# Patient Record
Sex: FEMALE | Race: WHITE
[De-identification: names, ages, dates, MRNs, and addresses within clinical notes are randomized per-mention and may not be internally consistent; named-entity substitution may affect disease eponyms.]

---

## 2017-11-19 ENCOUNTER — HOSPITAL ENCOUNTER (EMERGENCY)
Dept: HOSPITAL 7 - FB.ED | Age: 32
Discharge: HOME | End: 2017-11-19
Payer: COMMERCIAL

## 2017-11-19 VITALS — SYSTOLIC BLOOD PRESSURE: 121 MMHG | DIASTOLIC BLOOD PRESSURE: 59 MMHG

## 2017-11-19 DIAGNOSIS — R10.9: Primary | ICD-10-CM

## 2017-11-19 NOTE — ER
DATE SEEN:  11/19/2017

 

TIME SEEN:  2200 hours.

 

CHIEF COMPLAINT:  Abdominal pain.

 

HISTORY OF PRESENT ILLNESS:  This is a 32-year-old female with abdominal pain on

and off for two days, right lower quadrant, radiates to the umbilicus,

associated with diarrhea.  Nothing seems to help.

 

REVIEW OF SYSTEMS:  Denies any urinary symptoms, any vaginal discharge, but

complains of painful sex.

 

SOCIAL HISTORY:  Tobacco use disorder.

 

FAMILY HISTORY:  Crohn disease and colon cancer in the father.

 

PHYSICAL EXAMINATION:  GENERAL:  Afebrile, normotensive, generally not in

distress.  ABDOMEN:  Soft with tenderness to the right lower quadrant in the

pelvic area.  MENTAL STATUS:  Alert, tearful at times.

 

LABS:  Normal labs including a urine pregnancy, CBC, and CMP.  CT of the abdomen

and pelvis showed some circumferential inflammation of the terminal ileus,

nonspecific findings.  No appendicitis.

 

IMPRESSION:  Abdominal pain of unknown reason.

 

PLAN:  Treatment supportive.  Prescription for Bentyl was provided and the

patient advised to follow up with PCP in this coming week.

 

Job#: [id number]

DD: 11/19/2017 2330

DT: 11/19/2017 2343 TN/KP

## 2018-06-18 ENCOUNTER — HOSPITAL ENCOUNTER (EMERGENCY)
Dept: HOSPITAL 7 - FB.ED | Age: 33
Discharge: HOME | End: 2018-06-18
Payer: COMMERCIAL

## 2018-06-18 VITALS — SYSTOLIC BLOOD PRESSURE: 103 MMHG | DIASTOLIC BLOOD PRESSURE: 50 MMHG

## 2018-06-18 DIAGNOSIS — Z79.899: ICD-10-CM

## 2018-06-18 DIAGNOSIS — K85.90: ICD-10-CM

## 2018-06-18 DIAGNOSIS — K50.00: Primary | ICD-10-CM

## 2018-06-18 DIAGNOSIS — Z79.84: ICD-10-CM

## 2018-06-18 PROCEDURE — 85025 COMPLETE CBC W/AUTO DIFF WBC: CPT

## 2018-06-18 PROCEDURE — 81025 URINE PREGNANCY TEST: CPT

## 2018-06-18 PROCEDURE — 99284 EMERGENCY DEPT VISIT MOD MDM: CPT

## 2018-06-18 PROCEDURE — 96374 THER/PROPH/DIAG INJ IV PUSH: CPT

## 2018-06-18 PROCEDURE — 82150 ASSAY OF AMYLASE: CPT

## 2018-06-18 PROCEDURE — 36415 COLL VENOUS BLD VENIPUNCTURE: CPT

## 2018-06-18 PROCEDURE — 96361 HYDRATE IV INFUSION ADD-ON: CPT

## 2018-06-18 PROCEDURE — 86140 C-REACTIVE PROTEIN: CPT

## 2018-06-18 PROCEDURE — 74177 CT ABD & PELVIS W/CONTRAST: CPT

## 2018-06-18 PROCEDURE — 81001 URINALYSIS AUTO W/SCOPE: CPT

## 2018-06-18 PROCEDURE — 80053 COMPREHEN METABOLIC PANEL: CPT

## 2018-06-18 NOTE — EDM.PDOC
ED HPI GENERAL MEDICAL PROBLEM





- General


Chief Complaint: Abdominal Pain


Stated Complaint: STOMACH PAIN,VOMITTING


Time Seen by Provider: 06/18/18 13:50


Source of Information: Reports: Patient


History Limitations: Reports: No Limitations





- History of Present Illness


INITIAL COMMENTS - FREE TEXT/NARRATIVE: 





c/o mid abd pain x 1w





dx Crohn 12/17, on Imuran x 2m





seen [place] [facility] 5d ago and had abd CT with mild inflammation at terminal ileum 

and neg CBC/CMP, did not have amylase, tx Protonix for GERD





pt still with pain across upper abd and has amylase >650, CT today with nl 

pancreas and inflammatoin still at termianl ileum but also involving 2 loops of 

jejenum





pt appears to have both pancreatitis (with nl LFTs) and flare of Crohns





pt wonders if Imuran may have caused pancreatitis (<1% occurrence as per 

UpToDate), had less pain off of Imuran which GI told her works only 30% of the 

time





pt comes to ED today at instruction of her GI office





pt adamant about not being admitted altho understands that she can come back





she will check with her GI tomorrow





some N, no V, no f/c/d





was quite uncomfortable, did feel better after IVF





- Related Data


 Allergies











Allergy/AdvReac Type Severity Reaction Status Date / Time


 


No Known Allergies Allergy   Verified 06/18/18 14:19











Home Meds: 


 Home Meds





metFORMIN [Glucophage XR] 750 mg PO DAILY 09/30/16 [History]


Acetaminophen/HYDROcodone [Norco 325-5 MG] 1 tab PO Q6H PRN #6 tab 06/18/18 [Rx]


Ethinyl Estradiol/Drospirenone [Ocella 3 MG-0.03 MG] 1 tab PO DAILY 06/18/18 [

History]


Ondansetron [Ondansetron ODT] 4 mg PO Q6H PRN #6 tab.rapdis 06/18/18 [Rx]


Pantoprazole [ProTONIX***] 40 mg PO DAILY 06/18/18 [History]


Sucralfate [Carafate] 10 ml PO QID 06/18/18 [History]











Past Medical History


OB/GYN History: Reports: Polycystic Ovaries, Pregnancy





- Past Surgical History


GI Surgical History: Reports: Cholecystectomy





Social & Family History





- Family History


Family Medical History: Unobtainable





- Caffeine Use


Caffeine Use: Reports: Soda, Tea





ED ROS GENERAL





- Review of Systems


Review Of Systems: See Below


Constitutional: Reports: No Symptoms.  Denies: Fever


HEENT: Reports: No Symptoms


Respiratory: Reports: No Symptoms


Cardiovascular: Reports: No Symptoms


Endocrine: Reports: No Symptoms


GI/Abdominal: Reports: Abdominal Pain, Nausea


: Reports: No Symptoms


Musculoskeletal: Reports: No Symptoms


Skin: Reports: No Symptoms


Neurological: Reports: No Symptoms


Psychiatric: Reports: No Symptoms


Hematologic/Lymphatic: Reports: No Symptoms


Immunologic: Reports: No Symptoms





ED EXAM, GI/ABD





- Physical Exam


Exam: See Below


Exam Limited By: No Limitations


General Appearance: Alert, WD/WN, Moderate Distress


Ears: Normal External Exam, Hearing Grossly Normal


Nose: Normal Inspection, Normal Mucosa, No Blood


Throat/Mouth: Normal Inspection, Normal Oropharynx, Normal Voice


Head: Atraumatic, Normocephalic


Neck: Normal Inspection, Supple, Non-Tender, Full Range of Motion


Respiratory/Chest: No Respiratory Distress, Lungs Clear, Normal Breath Sounds, 

No Accessory Muscle Use, Chest Non-Tender


Cardiovascular: Normal Peripheral Pulses, Regular Rate, Rhythm, No Edema, No 

Gallop, No JVD, No Murmur, No Rub


GI/Abdominal Exam: Soft, No Distention, Other (1+ tender across upper abd, not 

localized, no guard, no rebound)


Back Exam: Normal Inspection, Full Range of Motion, NT


Extremities: Normal Inspection, Non-Tender


Neurological: Alert, Oriented, CN II-XII Intact, Normal Cognition, Normal Gait, 

Normal Reflexes, No Motor/Sensory Deficits


Psychiatric: Normal Affect, Normal Mood


Skin Exam: Warm, Dry, Intact, Normal Color, No Rash


Lymphatic: No Adenopathy





Course





- Orders/Labs/Meds


Orders: 


 Active Orders 24 hr











 Category Date Time Status


 


 Abdomen Pelvis w Cont [CT] Stat Exams  06/18/18 14:41 Taken


 


 HCG QUALITATIVE,URINE [URCHEM] Stat Lab  06/18/18 14:45 Ordered


 


 URINALYSIS W/MICROSCOPIC [UA W/MICROSCOPIC] [URIN] Stat Lab  06/18/18 14:45 

Ordered











Labs: 


 Laboratory Tests











  06/18/18 06/18/18 06/18/18 Range/Units





  14:05 14:05 14:05 


 


WBC  11.6    (4.5-12.0)  X10-3/uL


 


RBC  4.43    (3.23-5.20)  x10(6)uL


 


Hgb  14.0    (11.5-15.5)  g/dL


 


Hct  41.7    (30.0-51.3)  %


 


MCV  94.1    (80-96)  fL


 


MCH  31.5    (27.7-33.6)  pg


 


MCHC  33.5    (32.2-35.4)  g/dL


 


RDW  12.2    (11.5-15.5)  %


 


Plt Count  219    (125-369)  X10(3)uL


 


MPV  8.3    (7.4-10.4)  fL


 


Neut % (Auto)  79.6    (46-82)  %


 


Lymph % (Auto)  10.5 L    (13-37)  %


 


Mono % (Auto)  8.5    (4-12)  %


 


Eos % (Auto)  0 L    (1.0-5.0)  %


 


Baso % (Auto)  1    (0-2)  %


 


Neut # (Auto)  9.3 H    (1.6-8.3)  #


 


Lymph # (Auto)  1.2    (0.6-5.0)  #


 


Mono # (Auto)  1.0    (0.0-1.3)  #


 


Eos # (Auto)  0.0    (0.0-0.8)  #


 


Baso # (Auto)  0.1    (0.0-0.2)  #


 


Sodium   139   (135-145)  mmol/L


 


Potassium   3.5   (3.5-5.3)  mmol/L


 


Chloride   104   (100-110)  mmol/L


 


Carbon Dioxide   26   (21-32)  mmol/L


 


BUN   13   (7-18)  mg/dL


 


Creatinine   0.7   (0.55-1.02)  mg/dL


 


Est Cr Clr Drug Dosing   TNP   


 


Estimated GFR (MDRD)   > 60   (>60)  


 


BUN/Creatinine Ratio   18.6   (9-20)  


 


Glucose   108   ()  mg/dL


 


Calcium   8.4 L   (8.6-10.2)  mg/dL


 


Total Bilirubin   0.1   (0.1-1.3)  mg/dL


 


AST   16   (5-25)  IU/L


 


ALT   19   (12-36)  U/L


 


Alkaline Phosphatase   63   ()  IU/L


 


C-Reactive Protein    0.5  (0.5-0.9)  mg/dL


 


Total Protein   6.8   (6.0-8.0)  g/dL


 


Albumin   3.2 L   (3.5-5.2)  g/dL


 


Globulin   3.6   g/dL


 


Albumin/Globulin Ratio   0.9   


 


Amylase   > 650 H*   ()  U/L


 


Urine Color     (YELLOW)  


 


Urine Appearance     (CLEAR)  


 


Urine pH     (5.0-6.5)  


 


Ur Specific Gravity     (1.010-1.025)  


 


Urine Protein     (NEGATIVE)  mg/dL


 


Urine Glucose (UA)     (NEGATIVE)  mg/dL


 


Urine Ketones     (NEGATIVE)  mg/dL


 


Urine Occult Blood     (NEGATIVE)  


 


Urine Nitrite     (NEGATIVE)  


 


Urine Bilirubin     (NEGATIVE)  


 


Urine Urobilinogen     (NEGATIVE)  mg/dL


 


Ur Leukocyte Esterase     (NEGATIVE)  


 


Urine RBC     (0)  


 


Urine WBC     (0)  


 


Ur Squamous Epith Cells     (NS,R,O)  


 


Urine Bacteria     (NS)  


 


Urine HCG, Qual     (NEGATIVE)  














  06/18/18 06/18/18 Range/Units





  14:45 14:45 


 


WBC    (4.5-12.0)  X10-3/uL


 


RBC    (3.23-5.20)  x10(6)uL


 


Hgb    (11.5-15.5)  g/dL


 


Hct    (30.0-51.3)  %


 


MCV    (80-96)  fL


 


MCH    (27.7-33.6)  pg


 


MCHC    (32.2-35.4)  g/dL


 


RDW    (11.5-15.5)  %


 


Plt Count    (125-369)  X10(3)uL


 


MPV    (7.4-10.4)  fL


 


Neut % (Auto)    (46-82)  %


 


Lymph % (Auto)    (13-37)  %


 


Mono % (Auto)    (4-12)  %


 


Eos % (Auto)    (1.0-5.0)  %


 


Baso % (Auto)    (0-2)  %


 


Neut # (Auto)    (1.6-8.3)  #


 


Lymph # (Auto)    (0.6-5.0)  #


 


Mono # (Auto)    (0.0-1.3)  #


 


Eos # (Auto)    (0.0-0.8)  #


 


Baso # (Auto)    (0.0-0.2)  #


 


Sodium    (135-145)  mmol/L


 


Potassium    (3.5-5.3)  mmol/L


 


Chloride    (100-110)  mmol/L


 


Carbon Dioxide    (21-32)  mmol/L


 


BUN    (7-18)  mg/dL


 


Creatinine    (0.55-1.02)  mg/dL


 


Est Cr Clr Drug Dosing    


 


Estimated GFR (MDRD)    (>60)  


 


BUN/Creatinine Ratio    (9-20)  


 


Glucose    ()  mg/dL


 


Calcium    (8.6-10.2)  mg/dL


 


Total Bilirubin    (0.1-1.3)  mg/dL


 


AST    (5-25)  IU/L


 


ALT    (12-36)  U/L


 


Alkaline Phosphatase    ()  IU/L


 


C-Reactive Protein    (0.5-0.9)  mg/dL


 


Total Protein    (6.0-8.0)  g/dL


 


Albumin    (3.5-5.2)  g/dL


 


Globulin    g/dL


 


Albumin/Globulin Ratio    


 


Amylase    ()  U/L


 


Urine Color  Yellow   (YELLOW)  


 


Urine Appearance  Clear   (CLEAR)  


 


Urine pH  7.0 H   (5.0-6.5)  


 


Ur Specific Gravity  1.010   (1.010-1.025)  


 


Urine Protein  Negative   (NEGATIVE)  mg/dL


 


Urine Glucose (UA)  Normal   (NEGATIVE)  mg/dL


 


Urine Ketones  Negative   (NEGATIVE)  mg/dL


 


Urine Occult Blood  Negative   (NEGATIVE)  


 


Urine Nitrite  Negative   (NEGATIVE)  


 


Urine Bilirubin  Negative   (NEGATIVE)  


 


Urine Urobilinogen  Normal   (NEGATIVE)  mg/dL


 


Ur Leukocyte Esterase  Negative   (NEGATIVE)  


 


Urine RBC  0-5   (0)  


 


Urine WBC  0-5   (0)  


 


Ur Squamous Epith Cells  Occasional   (NS,R,O)  


 


Urine Bacteria  Rare H   (NS)  


 


Urine HCG, Qual   Negative  (NEGATIVE)  











Meds: 


Medications














Discontinued Medications














Generic Name Dose Route Start Last Admin





  Trade Name Freq  PRN Reason Stop Dose Admin


 


Acetaminophen  1,000 mg  06/18/18 13:58  06/18/18 14:11





  Tylenol Extra Strength  PO  06/18/18 13:59  1,000 mg





  ONETIME ONE   Administration





     





     





     





     


 


Al Hydroxide/Mg Hydroxide  30 ml  06/18/18 14:18  06/18/18 14:24





  Mag-Al Susp  PO  06/18/18 14:19  30 ml





  NOW ONE   Administration





     





     





     





     


 


Diatrizoate Meglum/Diatrizoate Sod  30 ml  06/18/18 15:25  06/18/18 16:12





  Gastrografin 37%  PO  06/18/18 15:26  30 ml





  .AS DIRECTED ONE   Administration





     





     





     





     


 


Sodium Chloride  1,000 mls @ 999 mls/hr  06/18/18 13:58  06/18/18 14:11





  Normal Saline  IV  06/18/18 14:58  999 mls/hr





  .BOLUS ONE   Administration





     





     





     





     


 


Iopamidol  75 ml  06/18/18 15:37  06/18/18 16:10





  Isovue-370 (76%)  IV  06/18/18 15:38  75 ml





  ONETIME ONE   Administration





     





     





     





     


 


Ketorolac Tromethamine  30 mg  06/18/18 13:58  06/18/18 14:11





  Toradol  IVPUSH  06/18/18 13:59  30 mg





  ONETIME ONE   Administration





     





     





     





     


 


Lidocaine HCl  15 ml  06/18/18 14:19  06/18/18 14:25





  Xylocaine 2% Viscous  PO  06/18/18 14:20  15 ml





  ONETIME ONE   Administration





     





     





     





     


 


Ondansetron HCl  4 mg  06/18/18 13:58  06/18/18 14:11





  Zofran  IVPUSH  06/18/18 13:59  4 mg





  ONETIME ONE   Administration





     





     





     





     














Departure





- Departure


Time of Disposition: 17:28


Disposition: Home, Self-Care 01


Condition: Fair


Clinical Impression: 


 Crohns disease of small intestine, Pancreatitis








- Discharge Information


Prescriptions: 


Acetaminophen/HYDROcodone [Norco 325-5 MG] 1 tab PO Q6H PRN #6 tab


 PRN Reason: Pain


Ondansetron [Ondansetron ODT] 4 mg PO Q6H PRN #6 tab.rapdis


 PRN Reason: Nausea


Instructions:  Crohn Disease, Acute Pancreatitis


Referrals: 


Tremayne Tolliver PA [Primary Care Provider] - 


Forms:  ED Department Discharge


Additional Instructions: 


For pancreatitis, it is best not to eat for 24 hours, may drink clear liquids. 

Then continue clear liquids for 24 hours, then full liquid diet for 24 hours, 

then soft diet for 24 hours, then regular diet.





For pain, take acetaminophen 325 mg 2 tabs 4 times a day.





For pain, take acetaminophen with hydrocodone 5/325 mg 1 tab every 6 hours as 

needed.





For nausea, take ondansetron ODT 4 mg 1 tab under the tongue every 6 hours as 

needed.





For flare of Crohn disease, take prednisone 10 mg 3 tabs at bedtime tonight and 

tomorrow, then 2 tabs for the next three bedtimes.





Check with your GI doctor tomorrow.





Return to ED if you are feeling worse.





- My Orders


Last 24 Hours: 


My Active Orders





06/18/18 14:41


Abdomen Pelvis w Cont [CT] Stat 





06/18/18 14:45


HCG QUALITATIVE,URINE [URCHEM] Stat 


URINALYSIS W/MICROSCOPIC [UA W/MICROSCOPIC] [URIN] Stat 














- Assessment/Plan


Last 24 Hours: 


My Active Orders





06/18/18 14:41


Abdomen Pelvis w Cont [CT] Stat 





06/18/18 14:45


HCG QUALITATIVE,URINE [URCHEM] Stat 


URINALYSIS W/MICROSCOPIC [UA W/MICROSCOPIC] [URIN] Stat

## 2018-06-19 NOTE — CT
INDICATION:  Mid abdominal pain times one week, history of Crohns disease, 
very high amylase.



CT ABDOMEN AND PELVIS WITH CONTRAST:  Spiral 2.5 mm axial sections were 
obtained through the abdomen and pelvis with oral and IV contrast (73 mL Isovue 
370 at 2.6 mL/second and 100 second delay), with sagittal and coronal 
reconstructions, 06/18/2018 and compared with 11/19/2017.  



Total exam DLP = 407.60 mGy-cm.



On axial images #13 and #14, there is a small nodular density in the mid lobe, 
which may not have been included on the previous examination and likely 
represents a benign structure, possibly a lymph node. 



No definite active infiltrate or effusion was identified.  There is noted a 
calcified granuloma at the left lower lobe - lung base, which was present 
previously.



No pericardial effusion was seen.  The heart did not appear enlarged.  



The liver was normal in appearance.  Clips compatible with cholecystectomy are 
noted.  



The adrenal glands appear normal.  



Probable bilateral cystic structures are noted in the kidneys.  A tiny lesion 
is noted medially in the lower pole of the right kidney with a small lesion and 
a moderate sized lesion in the lower pole of the left kidney, the largest being 
in the lower pole inferiorly and measuring 24 mm.  No definite solid renal 
masses were seen.  No obstructive uropathy was identified.  



There appears to be a somewhat thickened wall of a few loops of proximal 
jejunum.  



One more distal jejunal loop appears to have a thickened wall also, and there 
appears to be thickened wall of the terminal ileum, which was also seen on the 
previous examination.  These findings are compatible with Crohns disease but 
should be correlated clinically.  



Otherwise, no organomegaly, mass lesions, or free fluid collections were 
identified in the abdomen.



In the pelvis, there is a probable follicular cyst measuring approximately 35 
mm at the left ovary and follicles seen at the right ovary, the largest 
measuring approximately 1.7 mm.  



No additional mass lesions, organomegaly, or free fluid collections were 
identified in the abdomen or pelvis.  



The urinary bladder was unremarkable.  



No bowel obstruction or free air was seen.



What appears to be the appendix appeared normal, seen on axial images #126 
through #136.  



IMPRESSION:

1.  Findings compatible with Crohns disease with thickening of the wall of 
bowel loops in the proximal and mid jejunum area and at the terminal ileum.  No 
free air, obstruction, or peritonitis was suggested.

2.  Probable follicular cyst at the left ovary.

3.  Post cholecystectomy.

4.  Minimal multicystic disease of the kidneys, the largest cyst being in the 
lower pole of the left kidney and measuring approximately 25 mm anterior-
posterior.



Report was called to Dr. Luke at 1739 hours on 06/18/2018.

CHRIS

## 2018-09-14 ENCOUNTER — HOSPITAL ENCOUNTER (EMERGENCY)
Dept: HOSPITAL 7 - FB.ED | Age: 33
Discharge: HOME | End: 2018-09-14
Payer: COMMERCIAL

## 2018-09-14 DIAGNOSIS — Z79.899: ICD-10-CM

## 2018-09-14 DIAGNOSIS — F17.210: ICD-10-CM

## 2018-09-14 DIAGNOSIS — M94.0: Primary | ICD-10-CM

## 2018-09-14 RX ADMIN — IOPAMIDOL ONE ML: 755 INJECTION, SOLUTION INTRAVENOUS at 22:10

## 2018-09-14 NOTE — EDM.PDOC
ED HPI GENERAL MEDICAL PROBLEM





- General


Chief Complaint: Chest Pain


Stated Complaint: CHESTPAIN


Time Seen by Provider: 09/14/18 19:36


Source of Information: Reports: Patient


History Limitations: Reports: No Limitations





- History of Present Illness


INITIAL COMMENTS - FREE TEXT/NARRATIVE: 





Presents with intermittent sharp chest pain associated with lightheadedness 

since this morning. Has been taking Filgotinib (experimental Crohns medication) 

x 10 weeks. No prior h/o CAD. Has had a cough for 3 weeks, was prescribed 

zithromax, cough has improved but persists.


Onset: Today


Duration: Day(s): (1)


Location: Reports: Chest


Quality: Reports: Sharp


Severity: Mild


  ** L anterior chest


Pain Score (Numeric/FACES): 5





- Related Data


 Allergies











Allergy/AdvReac Type Severity Reaction Status Date / Time


 


No Known Allergies Allergy   Verified 09/14/18 19:28











Home Meds: 


 Home Meds





.Filgotinib 200 mg DAILY 09/14/18 [History]


Levonorgestrel-Ethin Estradiol [Enpresse-28 Tablet] 1 tab DAILY 09/14/18 [

History]











Past Medical History


HEENT History: Reports: Impaired Vision


Gastrointestinal History: Reports: Inflammatory Bowel Disease (crohns disease), 

Irritable Bowel Syndrome


OB/GYN History: Reports: Polycystic Ovaries, Pregnancy





- Past Surgical History


GI Surgical History: Reports: Cholecystectomy





Social & Family History





- Family History


Family Medical History: Unobtainable





- Tobacco Use


Smoking Status *Q: Current Every Day Smoker


Tobacco Use Within Last Twelve Months: Cigarettes





- Caffeine Use


Caffeine Use: Reports: Soda, Tea





- Alcohol Use


Alcohol Use History: Yes


Alcohol Use Frequency: Rarely





ED ROS GENERAL





- Review of Systems


Review Of Systems: See Below


Constitutional: Reports: Other (lightheaded, near syncope)


HEENT: Reports: No Symptoms


Respiratory: Reports: Pleuritic Chest Pain, Cough


Cardiovascular: Reports: Chest Pain


Endocrine: Reports: No Symptoms


GI/Abdominal: Reports: No Symptoms


: Reports: No Symptoms


Musculoskeletal: Reports: No Symptoms


Skin: Reports: No Symptoms


Neurological: Reports: Dizziness.  Denies: Headache


Psychiatric: Reports: No Symptoms


Hematologic/Lymphatic: Reports: No Symptoms


Immunologic: Reports: No Symptoms





ED EXAM, GENERAL





- Physical Exam


Exam: See Below


Exam Limited By: No Limitations


General Appearance: Alert, WD/WN, No Apparent Distress


Ears: Normal External Exam


Nose: Normal Inspection, Normal Mucosa


Throat/Mouth: No Airway Compromise


Head: Atraumatic, Normocephalic


Neck: Full Range of Motion


Respiratory/Chest: No Respiratory Distress, Lungs Clear, Normal Breath Sounds, 

Other (+ left sided chest wall tenderness)


Cardiovascular: Regular Rate, Rhythm, No Gallop, No Murmur, No Rub


GI/Abdominal: No Distention


Extremities: Normal Range of Motion


Neurological: Alert, Oriented, No Motor/Sensory Deficits


Psychiatric: Normal Affect, Normal Mood


Skin Exam: Warm, Dry, Intact





EKG INTERPRETATION


EKG Date: 09/14/18


Time: 19:13


Rhythm: NSR


Rate (Beats/Min): 76


Axis: Normal


P-Wave: Present


QRS: Normal


ST-T: Normal


QT: Normal





Course





- Vital Signs


Last Recorded V/S: 


 Last Vital Signs











Temp  36.9 C   09/14/18 19:05


 


Pulse  64   09/14/18 19:05


 


Resp  18   09/14/18 19:05


 


BP  120/67   09/14/18 19:05


 


Pulse Ox  99   09/14/18 19:05














- Orders/Labs/Meds


Orders: 


 Active Orders 24 hr











 Category Date Time Status


 


 EKG Documentation Completion [RC] ASDIRECTED Care  09/14/18 19:34 Active


 


 CXR [Chest 1V Frontal] [CR] Stat Exams  09/14/18 19:34 Taken


 


 Chest w Cont [CT] Stat Exams  09/14/18 21:57 Taken


 


 HCG QUALITATIVE,URINE [URCHEM] Stat Lab  09/14/18 19:34 Ordered


 


 UA W/MICROSCOPIC [URIN] Stat Lab  09/14/18 19:34 Ordered


 


 Sodium Chloride 0.9% [Saline Flush] Med  09/14/18 19:35 Active





 10 ml FLUSH ASDIRECTED PRN   


 


 Saline Lock Insert [OM.PC] Routine Oth  09/14/18 19:35 Ordered


 


 EKG 12 Lead [EK] Stat Ther  09/14/18 19:34 Ordered








 Medication Orders





Sodium Chloride (Saline Flush)  10 ml FLUSH ASDIRECTED PRN


   PRN Reason: Keep Vein Open








Labs: 


 Laboratory Tests











  09/14/18 09/14/18 09/14/18 Range/Units





  19:22 19:22 19:22 


 


WBC  9.4    (4.5-12.0)  X10-3/uL


 


RBC  4.65    (3.23-5.20)  x10(6)uL


 


Hgb  14.8    (11.5-15.5)  g/dL


 


Hct  43.7    (30.0-51.3)  %


 


MCV  94.0    (80-96)  fL


 


MCH  31.7    (27.7-33.6)  pg


 


MCHC  33.8    (32.2-35.4)  g/dL


 


RDW  11.9    (11.5-15.5)  %


 


Plt Count  244    (125-369)  X10(3)uL


 


MPV  9.1    (7.4-10.4)  fL


 


Neut % (Auto)  71.0    (46-82)  %


 


Lymph % (Auto)  20.4    (13-37)  %


 


Mono % (Auto)  5.9    (4-12)  %


 


Eos % (Auto)  2    (1.0-5.0)  %


 


Baso % (Auto)  1    (0-2)  %


 


Neut # (Auto)  6.6    (1.6-8.3)  #


 


Lymph # (Auto)  1.9    (0.6-5.0)  #


 


Mono # (Auto)  0.6    (0.0-1.3)  #


 


Eos # (Auto)  0.2    (0.0-0.8)  #


 


Baso # (Auto)  0.1    (0.0-0.2)  #


 


PT   9.2   (8.7-11.1)  


 


INR   0.95   (0.89-1.13)  


 


D-Dimer, Quantitative    0.27  (0.0-0.59)  mg/LFEU


 


Sodium     (135-145)  mmol/L


 


Potassium     (3.5-5.3)  mmol/L


 


Chloride     (100-110)  mmol/L


 


Carbon Dioxide     (21-32)  mmol/L


 


BUN     (7-18)  mg/dL


 


Creatinine     (0.55-1.02)  mg/dL


 


Est Cr Clr Drug Dosing     


 


Estimated GFR (MDRD)     (>60)  


 


BUN/Creatinine Ratio     (9-20)  


 


Glucose     ()  mg/dL


 


Calcium     (8.6-10.2)  mg/dL


 


Total Bilirubin     (0.1-1.3)  mg/dL


 


AST     (5-25)  IU/L


 


ALT     (12-36)  U/L


 


Alkaline Phosphatase     ()  IU/L


 


Troponin I     (<0.017-0.056)  ng/mL


 


Total Protein     (6.0-8.0)  g/dL


 


Albumin     (3.5-5.2)  g/dL


 


Globulin     g/dL


 


Albumin/Globulin Ratio     














  09/14/18 09/14/18 Range/Units





  19:22 19:22 


 


WBC    (4.5-12.0)  X10-3/uL


 


RBC    (3.23-5.20)  x10(6)uL


 


Hgb    (11.5-15.5)  g/dL


 


Hct    (30.0-51.3)  %


 


MCV    (80-96)  fL


 


MCH    (27.7-33.6)  pg


 


MCHC    (32.2-35.4)  g/dL


 


RDW    (11.5-15.5)  %


 


Plt Count    (125-369)  X10(3)uL


 


MPV    (7.4-10.4)  fL


 


Neut % (Auto)    (46-82)  %


 


Lymph % (Auto)    (13-37)  %


 


Mono % (Auto)    (4-12)  %


 


Eos % (Auto)    (1.0-5.0)  %


 


Baso % (Auto)    (0-2)  %


 


Neut # (Auto)    (1.6-8.3)  #


 


Lymph # (Auto)    (0.6-5.0)  #


 


Mono # (Auto)    (0.0-1.3)  #


 


Eos # (Auto)    (0.0-0.8)  #


 


Baso # (Auto)    (0.0-0.2)  #


 


PT    (8.7-11.1)  


 


INR    (0.89-1.13)  


 


D-Dimer, Quantitative    (0.0-0.59)  mg/LFEU


 


Sodium  140   (135-145)  mmol/L


 


Potassium  3.4 L   (3.5-5.3)  mmol/L


 


Chloride  106   (100-110)  mmol/L


 


Carbon Dioxide  27   (21-32)  mmol/L


 


BUN  9   (7-18)  mg/dL


 


Creatinine  0.8   (0.55-1.02)  mg/dL


 


Est Cr Clr Drug Dosing  TNP   


 


Estimated GFR (MDRD)  > 60   (>60)  


 


BUN/Creatinine Ratio  11.3   (9-20)  


 


Glucose  99   ()  mg/dL


 


Calcium  8.6   (8.6-10.2)  mg/dL


 


Total Bilirubin  0.2   (0.1-1.3)  mg/dL


 


AST  12  D   (5-25)  IU/L


 


ALT  23  D   (12-36)  U/L


 


Alkaline Phosphatase  63   ()  IU/L


 


Troponin I   < 0.017 L  (<0.017-0.056)  ng/mL


 


Total Protein  7.0   (6.0-8.0)  g/dL


 


Albumin  3.4 L   (3.5-5.2)  g/dL


 


Globulin  3.6   g/dL


 


Albumin/Globulin Ratio  0.9   











Meds: 


Medications











Generic Name Dose Route Start Last Admin





  Trade Name Freq  PRN Reason Stop Dose Admin


 


Sodium Chloride  10 ml  09/14/18 19:35  





  Saline Flush  FLUSH   





  ASDIRECTED PRN   





  Keep Vein Open   





     





     





     














Discontinued Medications














Generic Name Dose Route Start Last Admin





  Trade Name Freq  PRN Reason Stop Dose Admin


 


Sodium Chloride  1,000 mls @ 999 mls/hr  09/14/18 19:35  09/14/18 19:48





  Normal Saline  IV  09/14/18 20:35  999 mls/hr





  .BOLUS ONE   Administration





     





     





     





     


 


Iopamidol  75 ml  09/14/18 22:03  09/14/18 22:10





  Isovue-370 (76%)  IV  09/14/18 22:04  75 ml





  ONETIME ONE   Administration





     





     





     





     














- Radiology Interpretation


Free Text/Narrative:: 





CXR: ill defined perihilar opacities


CT Chest w/ IV: Unremarkable CT





- Re-Assessments/Exams


Free Text/Narrative Re-Assessment/Exam: 





09/14/18 22:53


Symptoms have improved





Departure





- Departure


Time of Disposition: 22:53


Disposition: Home, Self-Care 01


Condition: Good


Clinical Impression: 


 Costochondritis





Instructions:  Costochondritis, Easy-to-Read


Referrals: 


Tremayne Tolliver PA [Primary Care Provider] - 


Forms:  ED Department Discharge


Additional Instructions: 


Rest, fluids, take OTC Tylenol as needed for pain. Follow up with your doctor 

in 2-3 days. Return to the ER if symptoms worsen.





- My Orders


Last 24 Hours: 


My Active Orders





09/14/18 19:34


EKG Documentation Completion [RC] ASDIRECTED 


CXR [Chest 1V Frontal] [CR] Stat 


HCG QUALITATIVE,URINE [URCHEM] Stat 


UA W/MICROSCOPIC [URIN] Stat 


EKG 12 Lead [EK] Stat 





09/14/18 19:35


Sodium Chloride 0.9% [Saline Flush]   10 ml FLUSH ASDIRECTED PRN 


Saline Lock Insert [OM.PC] Routine 





09/14/18 21:57


Chest w Cont [CT] Stat 














- Assessment/Plan


Last 24 Hours: 


My Active Orders





09/14/18 19:34


EKG Documentation Completion [RC] ASDIRECTED 


CXR [Chest 1V Frontal] [CR] Stat 


HCG QUALITATIVE,URINE [URCHEM] Stat 


UA W/MICROSCOPIC [URIN] Stat 


EKG 12 Lead [EK] Stat 





09/14/18 19:35


Sodium Chloride 0.9% [Saline Flush]   10 ml FLUSH ASDIRECTED PRN 


Saline Lock Insert [OM.PC] Routine 





09/14/18 21:57


Chest w Cont [CT] Stat

## 2018-09-15 VITALS — SYSTOLIC BLOOD PRESSURE: 108 MMHG | DIASTOLIC BLOOD PRESSURE: 72 MMHG

## 2019-12-27 NOTE — EDM.PDOC
ED HPI GENERAL MEDICAL PROBLEM





- General


Chief Complaint: Fever


Time Seen by Provider: 19 20:40


Source of Information: Reports: Patient


History Limitations: Reports: No Limitations





- History of Present Illness


INITIAL COMMENTS - FREE TEXT/NARRATIVE: 





Patient presented to the ED because of low grade fever (101). She had a 

hysterectomy this morning which was uneventful except that she has URI. Denies 

any N/V/D or dysuria.





- Related Data


 Allergies











Allergy/AdvReac Type Severity Reaction Status Date / Time


 


codeine Allergy  Stomach Verified 19 21:20





   Ache  











Home Meds: 


 Home Meds





.Filgotinib 200 mg DAILY 18 [History]


Levonorgestrel-Ethin Estradiol [Enpresse-28 Tablet] 1 tab DAILY 18 [

History]


Citalopram Hydrobromide [Celexa] 10 mg PO DAILY 18 [History]











Past Medical History


HEENT History: Reports: Impaired Vision


Gastrointestinal History: Reports: GERD, GI Bleed, Inflammatory Bowel Disease, 

Irritable Bowel Syndrome, PUD, Other (See Below)


Other Gastrointestinal History: Chrohns


Genitourinary History: Reports: None, Pyelonephritis


OB/GYN History: Reports: Polycystic Ovaries, Pregnancy


Other OB/GYN History: 


Neurological History: Reports: Migraines


Psychiatric History: Reports: Anxiety, Depression, Suicide Attempt





- Infectious Disease History


Infectious Disease History: Reports: Chicken Pox, MRSA





- Past Surgical History


GI Surgical History: Reports: Cholecystectomy, Colonoscopy


Neurological Surgical History: Reports: None





Social & Family History





- Family History


Family Medical History: Unobtainable





- Tobacco Use


Smoking Status *Q: Current Every Day Smoker


Years of Tobacco use: 20


Packs/Tins Daily: 1.5





- Caffeine Use


Caffeine Use: Reports: Soda, Tea





ED ROS GENERAL





- Review of Systems


Review Of Systems: See Below


Constitutional: Reports: No Symptoms


HEENT: Reports: No Symptoms


Respiratory: Reports: No Symptoms


Cardiovascular: Reports: No Symptoms


Endocrine: Reports: No Symptoms


GI/Abdominal: Reports: No Symptoms


: Reports: No Symptoms


Musculoskeletal: Reports: No Symptoms


Skin: Reports: No Symptoms


Neurological: Reports: No Symptoms


Psychiatric: Reports: No Symptoms


Hematologic/Lymphatic: Reports: No Symptoms


Immunologic: Reports: No Symptoms





ED EXAM, GENERAL





- Physical Exam


Exam: See Below


Exam Limited By: No Limitations


General Appearance: Alert, WD/WN, No Apparent Distress


Ear Exam: Bilateral Ear: TM normal


Nose: Normal Inspection, Normal Mucosa


Throat/Mouth: Normal Inspection, Normal Lips


Head: Atraumatic, Normocephalic


Neck: Normal Inspection, Supple, Non-Tender, Full Range of Motion


Respiratory/Chest: No Respiratory Distress, Lungs Clear, Normal Breath Sounds


Cardiovascular: Normal Peripheral Pulses, Regular Rate, Rhythm, No Edema, No JVD

, No Murmur, No Rub


GI/Abdominal: Normal Bowel Sounds, Soft, Non-Tender, No Organomegaly, No 

Distention, No Abnormal Bruit


Back Exam: Normal Inspection, Full Range of Motion


Extremities: Normal Inspection, Normal Range of Motion


Neurological: Alert





Course





- Vital Signs


Text/Narrative:: 





labs CXR discussed with patient and  and verbalized understanding.


Last Recorded V/S: 


 Last Vital Signs











Temp  37.1 C   19 20:38


 


Pulse  60   19 20:38


 


Resp  16   19 20:38


 


BP  135/60   19 20:38


 


Pulse Ox  98   19 20:38














- Orders/Labs/Meds


Orders: 


 Active Orders 24 hr











 Category Date Time Status


 


 Chest 2V [CR] Stat Exams  19 21:14 Taken











Labs: 


 Laboratory Tests











  19 Range/Units





  21:15 21:25 21:25 


 


WBC   11.3   (4.5-12.0)  X10-3/uL


 


RBC   4.56   (3.23-5.20)  x10(6)uL


 


Hgb   15.0   (11.5-15.5)  g/dL


 


Hct   44.3   (30.0-51.3)  %


 


MCV   97.2 H   (80-96)  fL


 


MCH   33.0   (27.7-33.6)  pg


 


MCHC   33.9   (32.2-35.4)  g/dL


 


RDW   11.8   (11.5-15.5)  %


 


Plt Count   235   (125-369)  X10(3)uL


 


MPV   8.7   (7.4-10.4)  fL


 


Neut % (Auto)   78.9   (46-82)  %


 


Lymph % (Auto)   13.8   (13-37)  %


 


Mono % (Auto)   5.6   (4-12)  %


 


Eos % (Auto)   0 L   (1.0-5.0)  %


 


Baso % (Auto)   2   (0-2)  %


 


Neut # (Auto)   8.9 H   (1.6-8.3)  #


 


Lymph # (Auto)   1.6   (0.6-5.0)  #


 


Mono # (Auto)   0.6   (0.0-1.3)  #


 


Eos # (Auto)   0.0   (0.0-0.8)  #


 


Baso # (Auto)   0.2   (0.0-0.2)  #


 


Sodium    142  (135-145)  mmol/L


 


Potassium    4.0  (3.5-5.3)  mmol/L


 


Chloride    108  (100-110)  mmol/L


 


Carbon Dioxide    27  (21-32)  mmol/L


 


BUN    8  (7-18)  mg/dL


 


Creatinine    0.8  (0.55-1.02)  mg/dL


 


Est Cr Clr Drug Dosing    91.01  mL/min


 


Estimated GFR (MDRD)    > 60  (>60)  


 


BUN/Creatinine Ratio    10.0  (9-20)  


 


Glucose    159 H  ()  mg/dL


 


Calcium    8.5 L  (8.6-10.2)  mg/dL


 


Urine Color  Yellow    (YELLOW)  


 


Urine Appearance  Clear    (CLEAR)  


 


Urine pH  7.0 H    (5.0-6.5)  


 


Ur Specific Gravity  1.010    (1.010-1.025)  


 


Urine Protein  Negative    (NEGATIVE)  mg/dL


 


Urine Glucose (UA)  Normal    (NORMAL)  mg/dL


 


Urine Ketones  Negative    (NEGATIVE)  mg/dL


 


Urine Occult Blood  Large H    (NEGATIVE)  


 


Urine Nitrite  Negative    (NEGATIVE)  


 


Urine Bilirubin  Negative    (NEGATIVE)  


 


Urine Urobilinogen  Normal    (NEGATIVE)  mg/dL


 


Ur Leukocyte Esterase  Negative    (NEGATIVE)  


 


Urine RBC  20-30 H    (0-5)  


 


Urine WBC  0-5    (0-5)  


 


Ur Squamous Epith Cells  Few H    (NS,R,O)  


 


Urine Bacteria  Few H    (NS)  














Departure





- Departure


Time of Disposition: 22:30


Disposition: Home, Self-Care 01


Condition: Good


Clinical Impression: 


 Postoperative fever








- Discharge Information


Instructions:  Abdominal Hysterectomy, Care After


Referrals: 


Agatha Alvarado MD [Primary Care Provider] - 


Forms:  ED Department Discharge


Additional Instructions: 


please read discharge instructions on post operative fever


increase oral fluids


tylenol 650 mg every 4-6 hours as needed for pain/fever


follow up as needed





Sepsis Event Note





- Evaluation


Sepsis Screening Result: No Definite Risk





- Focused Exam


Vital Signs: 


 Vital Signs











  Temp Pulse Resp BP Pulse Ox


 


 19 20:38  37.1 C  60  16  135/60  98











Date Exam was Performed: 19


Time Exam was Performed: 22:11





- My Orders


Last 24 Hours: 


My Active Orders





19 21:14


Chest 2V [CR] Stat 














- Assessment/Plan


Last 24 Hours: 


My Active Orders





19 21:14


Chest 2V [CR] Stat

## 2020-01-08 NOTE — EDM.PDOC
ED HPI GENERAL MEDICAL PROBLEM





- General


Chief Complaint: Back Pain or Injury


Stated Complaint: PAIN


Time Seen by Provider: 20 17:40


Source of Information: Reports: Patient


History Limitations: Reports: No Limitations





- History of Present Illness


INITIAL COMMENTS - FREE TEXT/NARRATIVE: 





Patient presented to the ED from the clinic because of low back pain post 

hysterectomy 12 days ago. There is no associated fever or chills,N/V urinary 

symptoms. She said she has been constipated x2-3 days now and feels like there 

is something protruding down her vaginal area whenever she wants to urinate or 

have a bowel movement. 


  ** back


Pain Score (Numeric/FACES): 10





- Related Data


 Allergies











Allergy/AdvReac Type Severity Reaction Status Date / Time


 


codeine Allergy  Stomach Verified 20 18:56





   Ache  











Home Meds: 


 Home Meds





.Filgotinib 200 mg DAILY 18 [History]


Levonorgestrel-Ethin Estradiol [Enpresse-28 Tablet] 1 tab DAILY 18 [

History]


Citalopram Hydrobromide [Celexa] 10 mg PO DAILY 18 [History]


Acetaminophen/HYDROcodone [Norco 325-5 MG] 1 - 2 tab PO Q4H PRN #10 tab  [Rx]











Past Medical History


HEENT History: Reports: Impaired Vision


Gastrointestinal History: Reports: GERD, GI Bleed, Inflammatory Bowel Disease, 

Irritable Bowel Syndrome, PUD, Other (See Below)


Other Gastrointestinal History: Chrohns


Genitourinary History: Reports: None, Pyelonephritis


OB/GYN History: Reports: Polycystic Ovaries, Pregnancy


Other OB/GYN History: 


Neurological History: Reports: Migraines


Psychiatric History: Reports: Anxiety, Depression, Suicide Attempt





- Infectious Disease History


Infectious Disease History: Reports: Chicken Pox, MRSA





- Past Surgical History


GI Surgical History: Reports: Cholecystectomy, Colonoscopy


Neurological Surgical History: Reports: None





Social & Family History





- Family History


Family Medical History: Unobtainable





- Tobacco Use


Smoking Status *Q: Current Every Day Smoker


Years of Tobacco use: 20


Packs/Tins Daily: 0.5





- Caffeine Use


Caffeine Use: Reports: Soda, Tea





ED ROS GENERAL





- Review of Systems


Review Of Systems: See Below


Constitutional: Reports: No Symptoms


HEENT: Reports: No Symptoms


Respiratory: Reports: No Symptoms


Cardiovascular: Reports: No Symptoms


Endocrine: Reports: No Symptoms


GI/Abdominal: Reports: No Symptoms


: Reports: Flank Pain


Musculoskeletal: Reports: No Symptoms


Skin: Reports: No Symptoms


Neurological: Reports: No Symptoms


Psychiatric: Reports: No Symptoms





ED EXAM, GI/ABD





- Physical Exam


Exam: See Below


Exam Limited By: No Limitations


General Appearance: Alert, WD/WN, No Apparent Distress


Ears: Normal External Exam, Normal Canal, Hearing Grossly Normal, Normal TMs


Nose: Normal Inspection, Normal Mucosa, No Blood


Throat/Mouth: Normal Inspection, Normal Lips, Normal Teeth, Normal Gums, Normal 

Oropharynx, Normal Voice, No Airway Compromise


Head: Atraumatic, Normocephalic


Neck: Normal Inspection, Supple, Non-Tender, Full Range of Motion


Respiratory/Chest: No Respiratory Distress, Lungs Clear, Normal Breath Sounds, 

No Accessory Muscle Use, Chest Non-Tender


Cardiovascular: Normal Peripheral Pulses, Regular Rate, Rhythm, No Edema, No 

Gallop, No JVD, No Murmur


GI/Abdominal Exam: Normal Bowel Sounds, Soft, Non-Tender, No Organomegaly


Back Exam: Normal Inspection, Full Range of Motion


Extremities: Normal Inspection, Normal Range of Motion, No Pedal Edema, Normal 

Capillary Refill





Course





- Vital Signs


Text/Narrative:: 





Labs and CT abd/pelvis was discussed with patient and  and verbalized 

full understanding


CT abd/pelvis-see result


Morphine 2 mg IV x2 doses


Senna S 2 po x1


GYN consult at [facility] and Dr Gabriel think that the small curvilinear 

fluid  along the vaginal cuff is more of fluid and or hematoma rather than an 

abscess. She will talk to Dr Glover tomorrowm so she can talk to Christine.


Last Recorded V/S: 


 Last Vital Signs











Temp  36.7 C   20 21:30


 


Pulse  54 L  20 21:30


 


Resp  18   20 21:30


 


BP  124/62   20 21:30


 


Pulse Ox  98   20 21:30














- Orders/Labs/Meds


Orders: 


 Active Orders 24 hr











 Category Date Time Status


 


 Abdomen Pelvis w Cont [CT] Stat Exams  20 19:37 Taken


 


 Chest 2V [CR] Stat Exams  20 19:37 Taken


 


 Saline Lock Insert [OM.PC] Routine Oth  20 17:52 Ordered











Labs: 


 Laboratory Tests











  20 Range/Units





  18:13 18:15 18:15 


 


WBC   10.9   (4.5-12.0)  X10-3/uL


 


RBC   4.77   (3.23-5.20)  x10(6)uL


 


Hgb   15.4   (11.5-15.5)  g/dL


 


Hct   45.8   (30.0-51.3)  %


 


MCV   96.0   (80-96)  fL


 


MCH   32.2   (27.7-33.6)  pg


 


MCHC   33.6   (32.2-35.4)  g/dL


 


RDW   11.5   (11.5-15.5)  %


 


Plt Count   273   (125-369)  X10(3)uL


 


MPV   8.4   (7.4-10.4)  fL


 


Neutrophils % (Manual)   70   (46-82)  %


 


Band Neutrophils %   3   (0-6)  %


 


Lymphocytes % (Manual)   21   (13-37)  %


 


Monocytes % (Manual)   5   (4-12)  %


 


Eosinophils % (Manual)   1   (0-5)  %


 


Sodium    142  (135-145)  mmol/L


 


Potassium    4.2  (3.5-5.3)  mmol/L


 


Chloride    106  (100-110)  mmol/L


 


Carbon Dioxide    27  (21-32)  mmol/L


 


BUN    7  (7-18)  mg/dL


 


Creatinine    0.7  (0.55-1.02)  mg/dL


 


Est Cr Clr Drug Dosing    TNP  


 


Estimated GFR (MDRD)    > 60  (>60)  


 


BUN/Creatinine Ratio    10.0  (9-20)  


 


Glucose    89  ()  mg/dL


 


Calcium    8.9  (8.6-10.2)  mg/dL


 


C-Reactive Protein     (0.5-0.9)  mg/dL


 


Urine Color  Yellow    (YELLOW)  


 


Urine Appearance  Clear    (CLEAR)  


 


Urine pH  6.0    (5.0-6.5)  


 


Ur Specific Gravity  1.015    (1.010-1.025)  


 


Urine Protein  500 H    (NEGATIVE)  mg/dL


 


Urine Glucose (UA)  100 H    (NORMAL)  mg/dL


 


Urine Ketones  Negative    (NEGATIVE)  mg/dL


 


Urine Occult Blood  Moderate H    (NEGATIVE)  


 


Urine Nitrite  Negative    (NEGATIVE)  


 


Urine Bilirubin  Negative    (NEGATIVE)  


 


Urine Urobilinogen  Normal    (NEGATIVE)  mg/dL


 


Ur Leukocyte Esterase  Negative    (NEGATIVE)  


 


Urine RBC  0-5    (0-5)  


 


Urine WBC  0-5    (0-5)  


 


Ur Squamous Epith Cells  Occasional    (NS,R,O)  


 


Urine Bacteria  Few H    (NS)  














  20 Range/Units





  18:15 


 


WBC   (4.5-12.0)  X10-3/uL


 


RBC   (3.23-5.20)  x10(6)uL


 


Hgb   (11.5-15.5)  g/dL


 


Hct   (30.0-51.3)  %


 


MCV   (80-96)  fL


 


MCH   (27.7-33.6)  pg


 


MCHC   (32.2-35.4)  g/dL


 


RDW   (11.5-15.5)  %


 


Plt Count   (125-369)  X10(3)uL


 


MPV   (7.4-10.4)  fL


 


Neutrophils % (Manual)   (46-82)  %


 


Band Neutrophils %   (0-6)  %


 


Lymphocytes % (Manual)   (13-37)  %


 


Monocytes % (Manual)   (4-12)  %


 


Eosinophils % (Manual)   (0-5)  %


 


Sodium   (135-145)  mmol/L


 


Potassium   (3.5-5.3)  mmol/L


 


Chloride   (100-110)  mmol/L


 


Carbon Dioxide   (21-32)  mmol/L


 


BUN   (7-18)  mg/dL


 


Creatinine   (0.55-1.02)  mg/dL


 


Est Cr Clr Drug Dosing   


 


Estimated GFR (MDRD)   (>60)  


 


BUN/Creatinine Ratio   (9-20)  


 


Glucose   ()  mg/dL


 


Calcium   (8.6-10.2)  mg/dL


 


C-Reactive Protein  1.2 H  (0.5-0.9)  mg/dL


 


Urine Color   (YELLOW)  


 


Urine Appearance   (CLEAR)  


 


Urine pH   (5.0-6.5)  


 


Ur Specific Gravity   (1.010-1.025)  


 


Urine Protein   (NEGATIVE)  mg/dL


 


Urine Glucose (UA)   (NORMAL)  mg/dL


 


Urine Ketones   (NEGATIVE)  mg/dL


 


Urine Occult Blood   (NEGATIVE)  


 


Urine Nitrite   (NEGATIVE)  


 


Urine Bilirubin   (NEGATIVE)  


 


Urine Urobilinogen   (NEGATIVE)  mg/dL


 


Ur Leukocyte Esterase   (NEGATIVE)  


 


Urine RBC   (0-5)  


 


Urine WBC   (0-5)  


 


Ur Squamous Epith Cells   (NS,R,O)  


 


Urine Bacteria   (NS)  











Meds: 


Medications














Discontinued Medications














Generic Name Dose Route Start Last Admin





  Trade Name Freq  PRN Reason Stop Dose Admin


 


Sodium Chloride  1,000 mls @ 999 mls/hr  20 18:00  20 18:45





  Normal Saline  IV   999 mls/hr





  ASDIRECTED MATEO   Administration





     





     





     





     


 


Iopamidol  100 ml  20 19:06  20 19:32





  Isovue-370 (76%)  IV  20 19:07  89 ml





  ONETIME ONE   Administration





     





     





     





     


 


Morphine Sulfate  2 mg  20 18:05  20 18:45





  Morphine  IVPUSH  20 18:06  2 mg





  ONETIME ONE   Administration





     





     





     





     


 


Morphine Sulfate  2 mg  20 21:12  20 21:23





  Morphine  IVPUSH  20 21:13  2 mg





  ONETIME ONE   Administration





     





     





     





     


 


Senna/Docusate Sodium  2 tab  20 21:12  20 21:23





  Senna Plus  PO  20 21:13  2 tab





  ONETIME ONE   Administration





     





     





     





     


 


Sodium Chloride  10 ml  20 17:52  20 21:24





  Saline Flush  FLUSH   10 ml





  ASDIRECTED PRN   Administration





  Keep Vein Open   





     





     





     














Departure





- Departure


Time of Disposition: 21:05


Disposition: Home, Self-Care 01


Condition: Good


Clinical Impression: 


 Post-operative pain








- Discharge Information


Prescriptions: 


Acetaminophen/HYDROcodone [Norco 325-5 MG] 1 - 2 tab PO Q4H PRN #10 tab


 PRN Reason: Pain


Instructions:  Acetaminophen; Hydrocodone tablets or capsules, Abdominal 

Hysterectomy, Care After, Easy-to-Read, Morphine injection solution, Preventing 

Constipation After Surgery


Referrals: 


Agatha Alvarado MD [Primary Care Provider] - 


Forms:  ED Department Discharge


Additional Instructions: 


please read discharge instructions on post operative pain and constipation


increase oral fluids


norco 5/325, take 1-2 tablets every 4-6 hours as needed for pain


miralax powder(over the counter( dissolve 2 scoops in two glasses of water and 

take it once daily until you have a normal bowel movement


senna s take 1 tablet daily(over the counter)


Dr Son's office will call you tomorrow





Sepsis Event Note





- Evaluation


Sepsis Screening Result: No Definite Risk





- Focused Exam


Vital Signs: 


 Vital Signs











  Temp Pulse Resp BP Pulse Ox


 


 20 21:30  36.7 C  54 L  18  124/62  98


 


 20 19:44  36.6 C  58 L  18  130/71  100


 


 20 17:39  36.8 C  85  18  129/89  98











Date Exam was Performed: 20


Time Exam was Performed: 04:48





- My Orders


Last 24 Hours: 


My Active Orders





20 17:52


Saline Lock Insert [OM.PC] Routine 





20 19:37


Abdomen Pelvis w Cont [CT] Stat 


Chest 2V [CR] Stat 














- Assessment/Plan


Last 24 Hours: 


My Active Orders





20 17:52


Saline Lock Insert [OM.PC] Routine 





20 19:37


Abdomen Pelvis w Cont [CT] Stat 


Chest 2V [CR] Stat

## 2020-01-09 NOTE — CR
INDICATION:  Postop pain and fever.  



CHEST TWO VIEWS:  PA and lateral views of the chest 01/08/20 were compared with 
12/27/19 and 09/14/18.  



The heart and mediastinum are unremarkable.  



A minimal dextroconvex scoliosis is again noted at the upper middle thoracic 
spine.  



An active infiltrate or effusion was not identified. 



No evidence of free air is noted under the hemidiaphragm leaves.  



Somewhat hyperaerated appearance with slightly flattened diaphragm leaves 
raises question of obstructive airway disease - correlate clinically.  



IMPRESSION:  

1.  Fairly stable appearance of the chest with no definite acute process.  

2.  A degree of obstructive airway disease may be present.  

3.  Minimal scoliosis is again noted.  
MTDD

## 2020-08-07 NOTE — CT
INDICATION:  Right-sided weakness, acute - no trauma.  



CT HEAD WITHOUT CONTRAST:  Spiral 3.75 mm axial sections were obtained through 
the brain without contrast with sagittal and coronal reconstructions as well as 
axial reconstructions 08/07/20 - no comparisons.  



Visualized paranasal sinuses and mastoid air cells were unremarkable.  

No cranial abnormality was suggested.  

No shift of midline structures, ventricular abnormalities or abnormal areas of 
density were identified - no bleeding site or hematoma was seen.  



IMPRESSION:  Normal CT Brain without contrast.  



Report was called to Dr. Nowak at 1636 hours.  

[facility]

## 2020-08-07 NOTE — EDM.PDOC
ED HPI GENERAL MEDICAL PROBLEM





- General


Time Seen by Provider: 20 15:05


Source of Information: Reports: Patient


History Limitations: Reports: No Limitations





- History of Present Illness


INITIAL COMMENTS - FREE TEXT/NARRATIVE: 





pt comes in with c/o dizzy spells on and off for the past week, report mild 

congestion, and sensation of tingling at her fingers and feats , today she felt 

little SOB while in the shower, she feels the dizziness when moving and it 

settles  down of shy lay down still, denies HAs, neck stiffness or any other 

associated neuro or resp or CV or systemic sx.


report Hx of crohn  , depression, anxiety and smoking also report compliance 

with taking her medications. 


  ** head


Pain Score (Numeric/FACES): 3





- Related Data


                                    Allergies











Allergy/AdvReac Type Severity Reaction Status Date / Time


 


codeine Allergy  Stomach Verified 20 16:06





   Ache  











Home Meds: 


                                    Home Meds





Citalopram Hydrobromide [Celexa] 30 mg PO DAILY 18 [History]


buPROPion HCL [Bupropion HCl Sr] 150 mg PO BID 20 [History]


estradioL [Estradiol] 1 tab PO DAILY 20 [History]











Past Medical History


HEENT History: Reports: Impaired Vision


Gastrointestinal History: Reports: GERD, GI Bleed, Inflammatory Bowel Disease, 

Irritable Bowel Syndrome, PUD, Other (See Below)


Other Gastrointestinal History: Chrohns


Genitourinary History: Reports: None, Pyelonephritis


OB/GYN History: Reports: Polycystic Ovaries, Pregnancy


Other OB/GYN History: 


Neurological History: Reports: Migraines


Psychiatric History: Reports: Anxiety, Depression, Suicide Attempt





- Infectious Disease History


Infectious Disease History: Reports: Chicken Pox, MRSA





- Past Surgical History


GI Surgical History: Reports: Cholecystectomy, Colonoscopy


Neurological Surgical History: Reports: None





Social & Family History





- Family History


Family Medical History: Unobtainable





- Caffeine Use


Caffeine Use: Reports: Soda, Tea





ED ROS GENERAL





- Review of Systems


Review Of Systems: See Below


Constitutional: Reports: No Symptoms


HEENT: Denies: Ear Discharge, Ear Pain, Hearing Loss


Respiratory: Reports: No Symptoms


Cardiovascular: Reports: No Symptoms


GI/Abdominal: Reports: No Symptoms


Musculoskeletal: Reports: No Symptoms


Skin: Reports: No Symptoms


Neurological: Reports: Dizziness, Tingling


Psychiatric: Reports: Anxiety





ED EXAM, GI/ABD





- Physical Exam


Exam: See Below


Exam Limited By: No Limitations


General Appearance: Alert, No Apparent Distress


Eyes: Bilateral: Normal Appearance (no nistagmus ), EOMI


Ears: Normal External Exam, Normal Canal, Normal TMs


Nose: Normal Inspection


Throat/Mouth: Normal Inspection


Head: Atraumatic, Normocephalic


Neck: Normal Inspection, Supple, Non-Tender


Respiratory/Chest: No Respiratory Distress, Lungs Clear, Normal Breath Sounds


Cardiovascular: Normal Peripheral Pulses, Regular Rate, Rhythm


GI/Abdominal Exam: Normal Bowel Sounds, Soft, Non-Tender


Back Exam: Normal Inspection, Full Range of Motion


Extremities: Normal Inspection, Normal Range of Motion


Neurological: Alert, Oriented, CN II-XII Intact, Normal Reflexes, No 

Motor/Sensory Deficits


Psychiatric: Normal Affect


Skin Exam: Warm





Course





- Vital Signs


Text/Narrative:: 


EKG shows NSR, labs unremarkable.


pt reported while waiting for her labs , subjective weakness at left arm, repeat

 neuro exam was nl, Head ct was obtained and shows no acute findings. 


pt now is resting comfortably, though still report dizziness with movement after

 the Antivert, and tingling at her fingers, she is ambulatory without di

fficulties. 


pt seems to be experiencing positional vertigo , she is medically stable for 

discharge with supportive mng.  


Last Recorded V/S: 


                                Last Vital Signs











Temp  36.8 C   20 15:00


 


Pulse  72   20 15:45


 


Resp  18   20 15:45


 


BP  143/87 H  20 15:45


 


Pulse Ox  98   20 15:45














- Orders/Labs/Meds


Orders: 


                               Active Orders 24 hr











 Category Date Time Status


 


 Head wo Cont [CT] Stat Exams  20 15:56 Taken











Labs: 


                                Laboratory Tests











  20 Range/Units





  15:30 15:30 15:30 


 


WBC  7.5    (4.5-12.0)  X10-3/uL


 


RBC  5.11    (3.23-5.20)  x10(6)uL


 


Hgb  16.2 H    (11.5-15.5)  g/dL


 


Hct  48.7    (30.0-51.3)  %


 


MCV  95.3    (80-96)  fL


 


MCH  31.6    (27.7-33.6)  pg


 


MCHC  33.2    (32.2-35.4)  g/dL


 


RDW  11.9    (11.5-15.5)  %


 


Plt Count  225    (125-369)  X10(3)uL


 


Sodium   140   (135-145)  mmol/L


 


Potassium   3.6   (3.5-5.3)  mmol/L


 


Chloride   104   (100-110)  mmol/L


 


Carbon Dioxide   27   (21-32)  mmol/L


 


BUN   8   (7-18)  mg/dL


 


Creatinine   0.9   (0.55-1.02)  mg/dL


 


Est Cr Clr Drug Dosing   TNP   


 


Estimated GFR (MDRD)   > 60   (>60)  


 


BUN/Creatinine Ratio   8.9 L   (9-20)  


 


Glucose   124 H   ()  mg/dL


 


Calcium   9.5   (8.6-10.2)  mg/dL


 


Total Bilirubin   0.4   (0.1-1.3)  mg/dL


 


AST   23  D   (5-25)  IU/L


 


ALT   41 H D   (12-36)  U/L


 


Alkaline Phosphatase   81   ()  IU/L


 


Total Protein   7.3   (6.0-8.0)  g/dL


 


Albumin   4.2   (3.5-5.2)  g/dL


 


Globulin   3.1   g/dL


 


Albumin/Globulin Ratio   1.4   


 


TSH, Ultra Sensitive    0.78  (0.36-3.74)  IU/mL











Meds: 


Medications














Discontinued Medications














Generic Name Dose Route Start Last Admin





  Trade Name Freq  PRN Reason Stop Dose Admin


 


Meclizine HCl  50 mg  20 15:19  20 15:30





  Antivert  PO  20 15:20  50 mg





  ONETIME ONE   Administration














Departure





- Departure


Time of Disposition: 16:55


Disposition: Home, Self-Care 01


Clinical Impression: 


 Positional vertigo








- Discharge Information


Referrals: 


PCP,None [Ordering Only Provider] - 





Sepsis Event Note (ED)





- Focused Exam


Vital Signs: 


                                   Vital Signs











  Temp Pulse Resp BP Pulse Ox


 


 20 15:45   72  18  143/87 H  98


 


 20 15:05   98  16  134/68  98


 


 20 15:00  36.8 C  92  20  127/94 H  100














- My Orders


Last 24 Hours: 


My Active Orders





20 15:56


Head wo Cont [CT] Stat 














- Assessment/Plan


Last 24 Hours: 


My Active Orders





20 15:56


Head wo Cont [CT] Stat